# Patient Record
Sex: MALE | Race: WHITE | Employment: FULL TIME | URBAN - METROPOLITAN AREA
[De-identification: names, ages, dates, MRNs, and addresses within clinical notes are randomized per-mention and may not be internally consistent; named-entity substitution may affect disease eponyms.]

---

## 2022-12-15 ENCOUNTER — APPOINTMENT (OUTPATIENT)
Dept: CT IMAGING | Age: 62
End: 2022-12-15
Attending: EMERGENCY MEDICINE
Payer: COMMERCIAL

## 2022-12-15 ENCOUNTER — HOSPITAL ENCOUNTER (EMERGENCY)
Age: 62
Discharge: HOME | End: 2022-12-15
Attending: EMERGENCY MEDICINE
Payer: COMMERCIAL

## 2022-12-15 ENCOUNTER — APPOINTMENT (OUTPATIENT)
Dept: GENERAL RADIOLOGY | Age: 62
End: 2022-12-15
Attending: EMERGENCY MEDICINE
Payer: COMMERCIAL

## 2022-12-15 VITALS
RESPIRATION RATE: 18 BRPM | WEIGHT: 197.8 LBS | BODY MASS INDEX: 29.98 KG/M2 | TEMPERATURE: 98.5 F | SYSTOLIC BLOOD PRESSURE: 156 MMHG | HEIGHT: 68 IN | HEART RATE: 86 BPM | OXYGEN SATURATION: 98 % | DIASTOLIC BLOOD PRESSURE: 103 MMHG

## 2022-12-15 DIAGNOSIS — R06.00 DYSPNEA, UNSPECIFIED TYPE: Primary | ICD-10-CM

## 2022-12-15 LAB
ALBUMIN SERPL-MCNC: 4.1 G/DL (ref 3.5–5)
ALBUMIN/GLOB SERPL: 1.5 {RATIO} (ref 1.1–2.2)
ALP SERPL-CCNC: 104 U/L (ref 45–117)
ALT SERPL-CCNC: 31 U/L (ref 12–78)
ANION GAP SERPL CALC-SCNC: 10 MMOL/L (ref 5–15)
AST SERPL W P-5'-P-CCNC: 14 U/L (ref 15–37)
BASOPHILS # BLD: 0 K/UL (ref 0–0.1)
BASOPHILS NFR BLD: 0 % (ref 0–1)
BILIRUB SERPL-MCNC: 0.9 MG/DL (ref 0.2–1)
BUN SERPL-MCNC: 30 MG/DL (ref 6–20)
BUN/CREAT SERPL: 24 (ref 12–20)
CA-I BLD-MCNC: 9.7 MG/DL (ref 8.5–10.1)
CHLORIDE SERPL-SCNC: 100 MMOL/L (ref 97–108)
CO2 SERPL-SCNC: 26 MMOL/L (ref 21–32)
CREAT SERPL-MCNC: 1.23 MG/DL (ref 0.7–1.3)
DIFFERENTIAL METHOD BLD: ABNORMAL
EOSINOPHIL # BLD: 0 K/UL (ref 0–0.4)
EOSINOPHIL NFR BLD: 0 % (ref 0–7)
ERYTHROCYTE [DISTWIDTH] IN BLOOD BY AUTOMATED COUNT: 12.6 % (ref 11.5–14.5)
GLOBULIN SER CALC-MCNC: 2.8 G/DL (ref 2–4)
GLUCOSE SERPL-MCNC: 97 MG/DL (ref 65–100)
HCT VFR BLD AUTO: 45.5 % (ref 36.6–50.3)
HGB BLD-MCNC: 15.5 G/DL (ref 12.1–17)
IMM GRANULOCYTES # BLD AUTO: 0.1 K/UL (ref 0–0.04)
IMM GRANULOCYTES NFR BLD AUTO: 1 % (ref 0–0.5)
LYMPHOCYTES # BLD: 1.7 K/UL (ref 0.8–3.5)
LYMPHOCYTES NFR BLD: 18 % (ref 12–49)
MAGNESIUM SERPL-MCNC: 2.4 MG/DL (ref 1.6–2.4)
MCH RBC QN AUTO: 32.5 PG (ref 26–34)
MCHC RBC AUTO-ENTMCNC: 34.1 G/DL (ref 30–36.5)
MCV RBC AUTO: 95.4 FL (ref 80–99)
MONOCYTES # BLD: 1.1 K/UL (ref 0–1)
MONOCYTES NFR BLD: 11 % (ref 5–13)
NEUTS SEG # BLD: 6.9 K/UL (ref 1.8–8)
NEUTS SEG NFR BLD: 70 % (ref 32–75)
PLATELET # BLD AUTO: 300 K/UL (ref 150–400)
PMV BLD AUTO: 9.6 FL (ref 8.9–12.9)
POTASSIUM SERPL-SCNC: 4.4 MMOL/L (ref 3.5–5.1)
PROT SERPL-MCNC: 6.9 G/DL (ref 6.4–8.2)
RBC # BLD AUTO: 4.77 M/UL (ref 4.1–5.7)
SODIUM SERPL-SCNC: 136 MMOL/L (ref 136–145)
TROPONIN-HIGH SENSITIVITY: 10 NG/L (ref 0–76)
WBC # BLD AUTO: 9.7 K/UL (ref 4.1–11.1)

## 2022-12-15 PROCEDURE — 71046 X-RAY EXAM CHEST 2 VIEWS: CPT

## 2022-12-15 PROCEDURE — 93005 ELECTROCARDIOGRAM TRACING: CPT

## 2022-12-15 PROCEDURE — 83735 ASSAY OF MAGNESIUM: CPT

## 2022-12-15 PROCEDURE — 74011000636 HC RX REV CODE- 636: Performed by: EMERGENCY MEDICINE

## 2022-12-15 PROCEDURE — 80053 COMPREHEN METABOLIC PANEL: CPT

## 2022-12-15 PROCEDURE — 85025 COMPLETE CBC W/AUTO DIFF WBC: CPT

## 2022-12-15 PROCEDURE — 71275 CT ANGIOGRAPHY CHEST: CPT

## 2022-12-15 PROCEDURE — 84484 ASSAY OF TROPONIN QUANT: CPT

## 2022-12-15 PROCEDURE — 99285 EMERGENCY DEPT VISIT HI MDM: CPT

## 2022-12-15 PROCEDURE — 36415 COLL VENOUS BLD VENIPUNCTURE: CPT

## 2022-12-15 RX ADMIN — IOPAMIDOL 100 ML: 755 INJECTION, SOLUTION INTRAVENOUS at 09:54

## 2022-12-15 NOTE — DISCHARGE INSTRUCTIONS
Thank you! Thank you for allowing me to care for you in the emergency department. It is my goal to provide you with excellent care. If you have not received excellent quality care, please ask to speak to the nurse manager. Please fill out the survey that will come to you by mail or email since we listen to your feedback! Below you will find a list of your tests from today's visit. Should you have any questions, please do not hesitate to call the emergency department. Labs  Recent Results (from the past 12 hour(s))   CBC WITH AUTOMATED DIFF    Collection Time: 12/15/22  9:07 AM   Result Value Ref Range    WBC 9.7 4.1 - 11.1 K/uL    RBC 4.77 4.10 - 5.70 M/uL    HGB 15.5 12.1 - 17.0 g/dL    HCT 45.5 36.6 - 50.3 %    MCV 95.4 80.0 - 99.0 FL    MCH 32.5 26.0 - 34.0 PG    MCHC 34.1 30.0 - 36.5 g/dL    RDW 12.6 11.5 - 14.5 %    PLATELET 787 976 - 754 K/uL    MPV 9.6 8.9 - 12.9 FL    NEUTROPHILS 70 32 - 75 %    LYMPHOCYTES 18 12 - 49 %    MONOCYTES 11 5 - 13 %    EOSINOPHILS 0 0 - 7 %    BASOPHILS 0 0 - 1 %    IMMATURE GRANULOCYTES 1 (H) 0.0 - 0.5 %    ABS. NEUTROPHILS 6.9 1.8 - 8.0 K/UL    ABS. LYMPHOCYTES 1.7 0.8 - 3.5 K/UL    ABS. MONOCYTES 1.1 (H) 0.0 - 1.0 K/UL    ABS. EOSINOPHILS 0.0 0.0 - 0.4 K/UL    ABS. BASOPHILS 0.0 0.0 - 0.1 K/UL    ABS. IMM. GRANS. 0.1 (H) 0.00 - 0.04 K/UL    DF AUTOMATED     METABOLIC PANEL, COMPREHENSIVE    Collection Time: 12/15/22  9:07 AM   Result Value Ref Range    Sodium 136 136 - 145 mmol/L    Potassium 4.4 3.5 - 5.1 mmol/L    Chloride 100 97 - 108 mmol/L    CO2 26 21 - 32 mmol/L    Anion gap 10 5 - 15 mmol/L    Glucose 97 65 - 100 mg/dL    BUN 30 (H) 6 - 20 mg/dL    Creatinine 1.23 0.70 - 1.30 mg/dL    BUN/Creatinine ratio 24 (H) 12 - 20      eGFR >60 >60 ml/min/1.73m2    Calcium 9.7 8.5 - 10.1 mg/dL    Bilirubin, total 0.9 0.2 - 1.0 mg/dL    AST (SGOT) 14 (L) 15 - 37 U/L    ALT (SGPT) 31 12 - 78 U/L    Alk.  phosphatase 104 45 - 117 U/L    Protein, total 6.9 6.4 - 8.2 g/dL Albumin 4.1 3.5 - 5.0 g/dL    Globulin 2.8 2.0 - 4.0 g/dL    A-G Ratio 1.5 1.1 - 2.2     TROPONIN-HIGH SENSITIVITY    Collection Time: 12/15/22  9:07 AM   Result Value Ref Range    Troponin-High Sensitivity 10 0 - 76 ng/L   MAGNESIUM    Collection Time: 12/15/22  9:07 AM   Result Value Ref Range    Magnesium 2.4 1.6 - 2.4 mg/dL       Radiologic Studies  CTA CHEST W OR W WO CONT   Final Result   No acute process or evidence of pulmonary embolism. XR CHEST PA LAT   Final Result   No acute cardiopulmonary disease. CT Results  (Last 48 hours)                 12/15/22 0954  CTA CHEST W OR W WO CONT Final result    Impression:  No acute process or evidence of pulmonary embolism. Narrative:  EXAM:  CTA CHEST W OR W WO CONT       INDICATION:   Cough, shortness of breath       COMPARISON: None. TECHNIQUE:    Precontrast  images were obtained to localize the volume for acquisition. Multislice helical CT arteriography was performed from the diaphragm to the   thoracic inlet during uneventful rapid bolus of 100 cc Isovue-370. Lung and soft   tissue windows were generated. Coronal and sagittal images were generated and   3D post processing consisting of coronal maximum intensity images was performed. CT dose reduction was achieved through use of a standardized protocol tailored   for this examination and automatic exposure control for dose modulation. FINDINGS:   CHEST:   THYROID: 3.3 cm left thyroid nodule is noted as well as a 2.2 cm left thyroid   nodule. MEDIASTINUM: No mass or lymphadenopathy. AURELIO: No mass or lymphadenopathy. THORACIC AORTA: No dissection or aneurysm. MAIN PULMONARY ARTERY: There is no evidence of pulmonary embolism. TRACHEA/BRONCHI: Patent. ESOPHAGUS: No wall thickening or dilatation. HEART: Normal in size. PLEURA: No effusion or pneumothorax. LUNGS: No nodule, mass, or airspace disease.    INCIDENTALLY IMAGED UPPER ABDOMEN: 14 mm right adrenal adenoma, 12 mm left   adrenal adenoma. No follow-up required. Gallstones. BONES: Degenerative changes are seen in the thoracic spine. CXR Results  (Last 48 hours)                 12/15/22 0925  XR CHEST PA LAT Final result    Impression:  No acute cardiopulmonary disease. Narrative: Indication: Cough. Exam: PA and lateral views of the chest.       There is no prior study for direct comparison. Findings: Cardiomediastinal silhouette is within normal limits. Lungs are clear   bilaterally. Pleural spaces are normal. Osseous structures are intact.                 ------------------------------------------------------------------------------------------------------------  The exam and treatment you received in the Emergency Department were for an urgent problem and are not intended as complete care. It is important that you follow-up with a doctor, nurse practitioner, or physician assistant to:  (1) confirm your diagnosis,  (2) re-evaluation of changes in your illness and treatment, and  (3) for ongoing care. Please take your discharge instructions with you when you go to your follow-up appointment. If you have any problem arranging a follow-up appointment, contact the Emergency Department. If your symptoms become worse or you do not improve as expected and you are unable to reach your health care provider, please return to the Emergency Department. We are available 24 hours a day. If a prescription has been provided, please have it filled as soon as possible to prevent a delay in treatment. If you have any questions or reservations about taking the medication due to side effects or interactions with other medications, please call your primary care provider or contact the ER.

## 2022-12-15 NOTE — Clinical Note
[address]  [address] [facility] [id number]  [phone]    Work/School Note    Date: 12/15/2022    To Whom It May concern:    Yandel Rao was seen and treated today in the emergency room by the following provider(s):  Attending Provider: Radha Krishnamurthy is excused from work/school on 12/15/22 and 12/16/22. He is medically clear to return to work/school on 12/17/2022.        Sincerely,          Wade Mraie, DO

## 2022-12-15 NOTE — ED TRIAGE NOTES
Dx with covid Saturday after thanksgiving at [facility], went back to [facility] shortly after due to still not feeling well, dx with mike, prescribed doxycycline, still not feeling well.

## 2022-12-15 NOTE — ED PROVIDER NOTES
EMERGENCY DEPARTMENT HISTORY AND PHYSICAL EXAM      Date: 12/15/2022  Patient Name: Siddharth Kitchen    History of Presenting Illness     Chief Complaint   Patient presents with    Fatigue       History Provided By: Patient    HPI: Siddharth Kitchen, 62 y.o. male presenting to the emergency department as referral from urgent care for evaluation of PE. Patient diagnosed with COVID approximately 3 weeks ago. Was reevaluated at urgent care for cough and shortness of breath and found to have pneumonia versus atelectasis. Was discharged home with antibiotic, steroid, breathing treatment. Patient reports persistent symptoms. He was instructed to seek evaluation in the emergency department if symptoms persisted to rule out possibility of PE in the setting of recent COVID diagnosis. Patient denies history of VTE. Denies unilateral leg swelling as well as hemoptysis. There are no other complaints, changes, or physical findings at this time. Past History   Past Medical History:  No past medical history on file. Past Surgical History:  No past surgical history on file. Family History:  No family history on file. Social History: Allergies:  No Known Allergies    PCP: None      Review of Systems   Review of Systems   Constitutional:  Negative for chills and fever. HENT:  Negative for congestion and rhinorrhea. Eyes:  Negative for photophobia and visual disturbance. Respiratory:  Positive for cough and shortness of breath. Cardiovascular:  Negative for chest pain and palpitations. Gastrointestinal:  Negative for abdominal pain, diarrhea, nausea and vomiting. Genitourinary:  Negative for difficulty urinating and dysuria. Musculoskeletal:  Negative for arthralgias and myalgias. Skin:  Negative for color change and rash. Neurological:  Negative for weakness and headaches. Psychiatric/Behavioral:  Negative for dysphoric mood and sleep disturbance.       Physical Exam   Physical Exam  Constitutional:       General: He is not in acute distress. Appearance: Normal appearance. He is not ill-appearing. HENT:      Head: Normocephalic and atraumatic. Right Ear: External ear normal.      Left Ear: External ear normal.      Nose: Nose normal.      Mouth/Throat:      Mouth: Mucous membranes are moist.   Eyes:      Extraocular Movements: Extraocular movements intact. Conjunctiva/sclera: Conjunctivae normal.      Pupils: Pupils are equal, round, and reactive to light. Cardiovascular:      Rate and Rhythm: Normal rate and regular rhythm. Pulses: Normal pulses. Pulmonary:      Effort: Pulmonary effort is normal. No respiratory distress. Breath sounds: Normal breath sounds. Abdominal:      General: Abdomen is flat. There is no distension. Musculoskeletal:         General: Normal range of motion. Cervical back: Normal range of motion. Skin:     General: Skin is warm and dry. Neurological:      General: No focal deficit present. Mental Status: He is alert and oriented to person, place, and time. Psychiatric:         Mood and Affect: Mood normal.         Behavior: Behavior normal.         Thought Content: Thought content normal.         Judgment: Judgment normal.       Lab and Diagnostic Study Results   Labs -     Recent Results (from the past 12 hour(s))   CBC WITH AUTOMATED DIFF    Collection Time: 12/15/22  9:07 AM   Result Value Ref Range    WBC 9.7 4.1 - 11.1 K/uL    RBC 4.77 4.10 - 5.70 M/uL    HGB 15.5 12.1 - 17.0 g/dL    HCT 45.5 36.6 - 50.3 %    MCV 95.4 80.0 - 99.0 FL    MCH 32.5 26.0 - 34.0 PG    MCHC 34.1 30.0 - 36.5 g/dL    RDW 12.6 11.5 - 14.5 %    PLATELET 042 154 - 524 K/uL    MPV 9.6 8.9 - 12.9 FL    NEUTROPHILS 70 32 - 75 %    LYMPHOCYTES 18 12 - 49 %    MONOCYTES 11 5 - 13 %    EOSINOPHILS 0 0 - 7 %    BASOPHILS 0 0 - 1 %    IMMATURE GRANULOCYTES 1 (H) 0.0 - 0.5 %    ABS. NEUTROPHILS 6.9 1.8 - 8.0 K/UL    ABS.  LYMPHOCYTES 1.7 0.8 - 3.5 K/UL    ABS. MONOCYTES 1.1 (H) 0.0 - 1.0 K/UL    ABS. EOSINOPHILS 0.0 0.0 - 0.4 K/UL    ABS. BASOPHILS 0.0 0.0 - 0.1 K/UL    ABS. IMM. GRANS. 0.1 (H) 0.00 - 0.04 K/UL    DF AUTOMATED     METABOLIC PANEL, COMPREHENSIVE    Collection Time: 12/15/22  9:07 AM   Result Value Ref Range    Sodium 136 136 - 145 mmol/L    Potassium 4.4 3.5 - 5.1 mmol/L    Chloride 100 97 - 108 mmol/L    CO2 26 21 - 32 mmol/L    Anion gap 10 5 - 15 mmol/L    Glucose 97 65 - 100 mg/dL    BUN 30 (H) 6 - 20 mg/dL    Creatinine 1.23 0.70 - 1.30 mg/dL    BUN/Creatinine ratio 24 (H) 12 - 20      eGFR >60 >60 ml/min/1.73m2    Calcium 9.7 8.5 - 10.1 mg/dL    Bilirubin, total 0.9 0.2 - 1.0 mg/dL    AST (SGOT) 14 (L) 15 - 37 U/L    ALT (SGPT) 31 12 - 78 U/L    Alk. phosphatase 104 45 - 117 U/L    Protein, total 6.9 6.4 - 8.2 g/dL    Albumin 4.1 3.5 - 5.0 g/dL    Globulin 2.8 2.0 - 4.0 g/dL    A-G Ratio 1.5 1.1 - 2.2     TROPONIN-HIGH SENSITIVITY    Collection Time: 12/15/22  9:07 AM   Result Value Ref Range    Troponin-High Sensitivity 10 0 - 76 ng/L   MAGNESIUM    Collection Time: 12/15/22  9:07 AM   Result Value Ref Range    Magnesium 2.4 1.6 - 2.4 mg/dL       Radiologic Studies -   [unfilled]  CT Results  (Last 48 hours)                 12/15/22 0954  CTA CHEST W OR W WO CONT Final result    Impression:  No acute process or evidence of pulmonary embolism. Narrative:  EXAM:  CTA CHEST W OR W WO CONT       INDICATION:   Cough, shortness of breath       COMPARISON: None. TECHNIQUE:    Precontrast  images were obtained to localize the volume for acquisition. Multislice helical CT arteriography was performed from the diaphragm to the   thoracic inlet during uneventful rapid bolus of 100 cc Isovue-370. Lung and soft   tissue windows were generated. Coronal and sagittal images were generated and   3D post processing consisting of coronal maximum intensity images was performed.      CT dose reduction was achieved through use of a standardized protocol tailored   for this examination and automatic exposure control for dose modulation. FINDINGS:   CHEST:   THYROID: 3.3 cm left thyroid nodule is noted as well as a 2.2 cm left thyroid   nodule. MEDIASTINUM: No mass or lymphadenopathy. AURELIO: No mass or lymphadenopathy. THORACIC AORTA: No dissection or aneurysm. MAIN PULMONARY ARTERY: There is no evidence of pulmonary embolism. TRACHEA/BRONCHI: Patent. ESOPHAGUS: No wall thickening or dilatation. HEART: Normal in size. PLEURA: No effusion or pneumothorax. LUNGS: No nodule, mass, or airspace disease. INCIDENTALLY IMAGED UPPER ABDOMEN: 14 mm right adrenal adenoma, 12 mm left   adrenal adenoma. No follow-up required. Gallstones. BONES: Degenerative changes are seen in the thoracic spine. CXR Results  (Last 48 hours)                 12/15/22 0925  XR CHEST PA LAT Final result    Impression:  No acute cardiopulmonary disease. Narrative: Indication: Cough. Exam: PA and lateral views of the chest.       There is no prior study for direct comparison. Findings: Cardiomediastinal silhouette is within normal limits. Lungs are clear   bilaterally. Pleural spaces are normal. Osseous structures are intact. Medical Decision Making and ED Course   Differential Diagnosis & Medical Decision Making Provider Note:   20-year-old male presenting for evaluation of cough, shortness of breath. Was told to come to the emergency department by urgent care for evaluation of PE. Recent COVID diagnosis. Reports symptoms persistent x3 weeks. Chest x-ray clear. No evidence of infection on CBC. CMP without significant abnormality. EKG nonischemic. Troponin, magnesium normal.  CTA chest demonstrating no evidence of pulmonary infiltrate or blood clot. Suspect symptoms may be related to a post-COVID syndrome. - I am the first and primary provider for this patient.  I reviewed the vital signs, available nursing notes, past medical history, past surgical history, family history and social history. The patient's presenting problems have been discussed, and the staff are in agreement with the care plan formulated and outlined with them. I have encouraged them to ask questions as they arise throughout their visit. Vital Signs-Reviewed the patient's vital signs. Patient Vitals for the past 12 hrs:   Temp Pulse Resp BP SpO2   12/15/22 0858 98.5 °F (36.9 °C) 86 18 (!) 156/103 98 %       EKG interpretation: (Preliminary): EKG Interpreted by me. Shows sinus rhythm with a ventricular to 73, , QRS 74, QTc 447 without evidence of ST depression or elevation. ED Course:            Procedures and Critical Care     Performed by: Wade Marei,   Procedures      Disposition   Disposition: Condition stable  DC- Adult Discharges: All of the diagnostic tests were reviewed and questions answered. Diagnosis, care plan and treatment options were discussed. The patient understands the instructions and will follow up as directed. The patients results have been reviewed with them. They have been counseled regarding their diagnosis. The patient verbally convey understanding and agreement of the signs, symptoms, diagnosis, treatment and prognosis and additionally agrees to follow up as recommended with their PCP in 24 - 48 hours. They also agree with the care-plan and convey that all of their questions have been answered. I have also put together some discharge instructions for them that include: 1) educational information regarding their diagnosis, 2) how to care for their diagnosis at home, as well a 3) list of reasons why they would want to return to the ED prior to their follow-up appointment, should their condition change. DC-The patient was given verbal follow-up instructions    DISCHARGE PLAN:  1. There are no discharge medications for this patient.     2.   Follow-up Information Follow up With Specialties Details Why Contact Info    [address] [facility]  As needed, If symptoms worsen [address] [place] [id number] [phone]          3. Return to ED if worse   4. There are no discharge medications for this patient. Remove if admitted/transferred    Diagnosis/Clinical Impression     Clinical Impression:   1. Dyspnea, unspecified type        Attestations: Luis Whatley, , am the primary clinician of record. Please note that this dictation was completed with [employer], the computer voice recognition software. Quite often unanticipated grammatical, syntax, homophones, and other interpretive errors are inadvertently transcribed by the computer software. Please disregard these errors. Please excuse any errors that have escaped final proofreading. Thank you.

## 2022-12-16 LAB
ATRIAL RATE: 73 BPM
CALCULATED P AXIS, ECG09: 61 DEGREES
CALCULATED R AXIS, ECG10: 21 DEGREES
CALCULATED T AXIS, ECG11: 21 DEGREES
DIAGNOSIS, 93000: NORMAL
P-R INTERVAL, ECG05: 164 MS
Q-T INTERVAL, ECG07: 406 MS
QRS DURATION, ECG06: 74 MS
QTC CALCULATION (BEZET), ECG08: 447 MS
VENTRICULAR RATE, ECG03: 73 BPM